# Patient Record
Sex: MALE | URBAN - METROPOLITAN AREA
[De-identification: names, ages, dates, MRNs, and addresses within clinical notes are randomized per-mention and may not be internally consistent; named-entity substitution may affect disease eponyms.]

---

## 2020-01-14 ENCOUNTER — OFFICE VISIT (OUTPATIENT)
Dept: URBAN - METROPOLITAN AREA CLINIC 33 | Facility: CLINIC | Age: 70
End: 2020-01-14
Payer: MEDICARE

## 2020-01-14 PROCEDURE — 99204 OFFICE O/P NEW MOD 45 MIN: CPT | Performed by: OPHTHALMOLOGY

## 2020-01-14 RX ORDER — OFLOXACIN 3 MG/ML
0.3 % SOLUTION/ DROPS OPHTHALMIC
Qty: 5 | Refills: 1 | Status: ACTIVE
Start: 2020-01-14

## 2020-01-14 ASSESSMENT — KERATOMETRY
OS: 43.63
OD: 43.88

## 2020-01-14 ASSESSMENT — VISUAL ACUITY
OS: 20/40
OD: 20/30

## 2020-01-14 ASSESSMENT — INTRAOCULAR PRESSURE
OD: 14
OS: 16

## 2020-01-14 NOTE — IMPRESSION/PLAN
Impression: Combined forms of age-related cataract, bilateral: H25.813. Condition: quality of life issue. Symptoms: could improve with surgery. Vision: vision affected. Plan: Cataracts account for the patient's complaints. Discussed all risks, benefits, procedures and recovery. Patient understands changing glasses will not improve vision. Patient desires to have surgery, recommend phacoemulsification with intraocular lens. RL-2 Discussed standard vs Toric vs Trifocal. Aim plano OU, Pt will consider IOL options. Patient understands that glasses may still be needed for best corrected visual acuity.

## 2020-02-28 ENCOUNTER — TESTING ONLY (OUTPATIENT)
Dept: URBAN - METROPOLITAN AREA CLINIC 33 | Facility: CLINIC | Age: 70
End: 2020-02-28
Payer: MEDICARE

## 2020-02-28 DIAGNOSIS — H25.813 COMBINED FORMS OF AGE-RELATED CATARACT, BILATERAL: Primary | ICD-10-CM

## 2020-02-28 PROCEDURE — 92136 OPHTHALMIC BIOMETRY: CPT | Performed by: OPHTHALMOLOGY

## 2021-07-27 ENCOUNTER — OFFICE VISIT (OUTPATIENT)
Dept: URBAN - METROPOLITAN AREA CLINIC 13 | Facility: CLINIC | Age: 71
End: 2021-07-27
Payer: MEDICARE

## 2021-07-27 DIAGNOSIS — Z96.1 PRESENCE OF PSEUDOPHAKIA: ICD-10-CM

## 2021-07-27 DIAGNOSIS — H43.391 OTHER VITREOUS OPACITIES, RIGHT EYE: ICD-10-CM

## 2021-07-27 PROCEDURE — 92134 CPTRZ OPH DX IMG PST SGM RTA: CPT | Performed by: OPHTHALMOLOGY

## 2021-07-27 PROCEDURE — 99214 OFFICE O/P EST MOD 30 MIN: CPT | Performed by: OPHTHALMOLOGY

## 2021-07-27 RX ORDER — PREDNISOLONE ACETATE 10 MG/ML
1 % SUSPENSION/ DROPS OPHTHALMIC
Qty: 5 | Refills: 3 | Status: ACTIVE
Start: 2021-07-27

## 2021-07-27 RX ORDER — OFLOXACIN 3 MG/ML
0.3 % SOLUTION/ DROPS OPHTHALMIC
Qty: 5 | Refills: 3 | Status: ACTIVE
Start: 2021-07-27

## 2021-07-27 ASSESSMENT — INTRAOCULAR PRESSURE
OS: 12
OD: 8

## 2021-07-27 NOTE — IMPRESSION/PLAN
Impression: S/P 25g PPV, Possible Gas x vit opacities OD - 33 Days. Other vitreous opacities, right eye  H43.391. Plan: Doing well. Retina attached.

## 2021-07-27 NOTE — IMPRESSION/PLAN
Impression: Vitreous degeneration, left eye: H43.812 Left. OCT:
OD: mild ERM
OS: wnl Plan: Indirect ophthalmoscopy with scleral depression was performed and no retinal breaks or evidence of detachment were identified. The diagnosis, natural history, and prognosis of PVD were discussed at length. The signs and symptoms of retinal break/detachment including increased flashes, new-onset floaters, and development of a shadow/curtain shade in the visual field were reviewed. Pt is very bothered by floaters OS. He had surgery for floaters OD and is very happy with that eye. He does not want to wait on surgery and OS and rather schedule ASAP. Discussed with patient that most pts do adjust to floaters over time and he may not end up needing surgery if this occurs. He understands but is eager to have sx OS. R/B/A of sx discussed.  

Rec: 25 g PPV OS x vit opacity

## 2021-08-03 ENCOUNTER — Encounter (OUTPATIENT)
Dept: URBAN - METROPOLITAN AREA EXTERNAL CLINIC 14 | Facility: EXTERNAL CLINIC | Age: 71
End: 2021-08-03
Payer: MEDICARE

## 2021-08-03 PROCEDURE — 67141 PROPH RTA DTCHMNT CRTX DTHRM: CPT | Performed by: OPHTHALMOLOGY

## 2021-08-04 ENCOUNTER — POST-OPERATIVE VISIT (OUTPATIENT)
Dept: URBAN - METROPOLITAN AREA CLINIC 7 | Facility: CLINIC | Age: 71
End: 2021-08-04
Payer: MEDICARE

## 2021-08-04 PROCEDURE — 99024 POSTOP FOLLOW-UP VISIT: CPT | Performed by: OPHTHALMOLOGY

## 2021-08-04 ASSESSMENT — INTRAOCULAR PRESSURE
OD: 12
OS: 16

## 2021-08-04 NOTE — IMPRESSION/PLAN
Impression: S/P 25g, PPV, OS OS - 1 Day. Vitreous degeneration, left eye  H43.812. Plan: PF/Oflox QID. RTC 1 week DFE OS

## 2021-08-18 ENCOUNTER — POST-OPERATIVE VISIT (OUTPATIENT)
Dept: URBAN - METROPOLITAN AREA CLINIC 7 | Facility: CLINIC | Age: 71
End: 2021-08-18
Payer: MEDICARE

## 2021-08-18 PROCEDURE — 99024 POSTOP FOLLOW-UP VISIT: CPT | Performed by: OPHTHALMOLOGY

## 2021-08-18 ASSESSMENT — INTRAOCULAR PRESSURE
OS: 11
OD: 10

## 2021-08-18 NOTE — IMPRESSION/PLAN
Impression: S/P 25g, PPV, OS x Vit Opacity OS - 15 Days. Vitreous degeneration, left eye  H43.812. Plan: Taper drops. RTC 1 month DFE OS OCT OS --Advised patient to use artificial tears for comfort.

## 2021-09-15 ENCOUNTER — POST-OPERATIVE VISIT (OUTPATIENT)
Dept: URBAN - METROPOLITAN AREA CLINIC 7 | Facility: CLINIC | Age: 71
End: 2021-09-15
Payer: MEDICARE

## 2021-09-15 DIAGNOSIS — H43.812 VITREOUS DEGENERATION, LEFT EYE: Primary | ICD-10-CM

## 2021-09-15 PROCEDURE — 99024 POSTOP FOLLOW-UP VISIT: CPT | Performed by: OPHTHALMOLOGY

## 2021-09-15 ASSESSMENT — INTRAOCULAR PRESSURE
OS: 14
OD: 16

## 2021-09-15 NOTE — IMPRESSION/PLAN
Impression: S/P 25g, PPV, OS x Vit Opacity OS - 43 Days. Vitreous degeneration, left eye  H43.812. Excellent post op course   Post operative instructions reviewed - Condition is improving -

OCT:
OS: wnl Plan: Doing well. off drops. recently  had amniotic membrane placed for dry eyes OS. Has some anterior stromal haze. Being treated at [facility]. Pt complains of small vit opacity OS. Discussed with him that this should get better on its own. RTC PRN. --Advised patient to use artificial tears for comfort.